# Patient Record
Sex: MALE | Race: WHITE | Employment: FULL TIME | URBAN - METROPOLITAN AREA
[De-identification: names, ages, dates, MRNs, and addresses within clinical notes are randomized per-mention and may not be internally consistent; named-entity substitution may affect disease eponyms.]

---

## 2020-09-12 ENCOUNTER — HOSPITAL ENCOUNTER (EMERGENCY)
Age: 35
Discharge: HOME | End: 2020-09-12
Attending: STUDENT IN AN ORGANIZED HEALTH CARE EDUCATION/TRAINING PROGRAM

## 2020-09-12 VITALS
OXYGEN SATURATION: 98 % | HEIGHT: 73 IN | RESPIRATION RATE: 15 BRPM | WEIGHT: 225 LBS | SYSTOLIC BLOOD PRESSURE: 127 MMHG | DIASTOLIC BLOOD PRESSURE: 96 MMHG | TEMPERATURE: 97.5 F | BODY MASS INDEX: 29.82 KG/M2 | HEART RATE: 60 BPM

## 2020-09-12 LAB
ANION GAP SERPL CALCULATED.3IONS-SCNC: 12 MMOL/L (ref 3–16)
BASOPHILS ABSOLUTE: 0 K/UL (ref 0–0.2)
BASOPHILS RELATIVE PERCENT: 0.4 %
BUN BLDV-MCNC: 15 MG/DL (ref 7–20)
CALCIUM SERPL-MCNC: 9.3 MG/DL (ref 8.3–10.6)
CHLORIDE BLD-SCNC: 102 MMOL/L (ref 99–110)
CO2: 23 MMOL/L (ref 21–32)
CREAT SERPL-MCNC: 0.8 MG/DL (ref 0.9–1.3)
EOSINOPHILS ABSOLUTE: 0 K/UL (ref 0–0.6)
EOSINOPHILS RELATIVE PERCENT: 0.3 %
GFR AFRICAN AMERICAN: >60
GFR NON-AFRICAN AMERICAN: >60
GLUCOSE BLD-MCNC: 110 MG/DL (ref 70–99)
HCT VFR BLD CALC: 42.9 % (ref 40.5–52.5)
HEMOGLOBIN: 15 G/DL (ref 13.5–17.5)
LYMPHOCYTES ABSOLUTE: 1.1 K/UL (ref 1–5.1)
LYMPHOCYTES RELATIVE PERCENT: 14.6 %
MCH RBC QN AUTO: 29.5 PG (ref 26–34)
MCHC RBC AUTO-ENTMCNC: 34.9 G/DL (ref 31–36)
MCV RBC AUTO: 84.6 FL (ref 80–100)
MONOCYTES ABSOLUTE: 0.4 K/UL (ref 0–1.3)
MONOCYTES RELATIVE PERCENT: 4.9 %
NEUTROPHILS ABSOLUTE: 6.3 K/UL (ref 1.7–7.7)
NEUTROPHILS RELATIVE PERCENT: 79.8 %
PDW BLD-RTO: 13.3 % (ref 12.4–15.4)
PLATELET # BLD: 242 K/UL (ref 135–450)
PMV BLD AUTO: 8.5 FL (ref 5–10.5)
POTASSIUM REFLEX MAGNESIUM: 4.2 MMOL/L (ref 3.5–5.1)
RBC # BLD: 5.07 M/UL (ref 4.2–5.9)
SODIUM BLD-SCNC: 137 MMOL/L (ref 136–145)
TSH REFLEX: 0.66 UIU/ML (ref 0.27–4.2)
WBC # BLD: 7.9 K/UL (ref 4–11)

## 2020-09-12 PROCEDURE — 84443 ASSAY THYROID STIM HORMONE: CPT

## 2020-09-12 PROCEDURE — 80048 BASIC METABOLIC PNL TOTAL CA: CPT

## 2020-09-12 PROCEDURE — 6370000000 HC RX 637 (ALT 250 FOR IP): Performed by: STUDENT IN AN ORGANIZED HEALTH CARE EDUCATION/TRAINING PROGRAM

## 2020-09-12 PROCEDURE — 85025 COMPLETE CBC W/AUTO DIFF WBC: CPT

## 2020-09-12 PROCEDURE — 99283 EMERGENCY DEPT VISIT LOW MDM: CPT

## 2020-09-12 RX ORDER — HYDROXYZINE PAMOATE 25 MG/1
25 CAPSULE ORAL ONCE
Status: COMPLETED | OUTPATIENT
Start: 2020-09-12 | End: 2020-09-12

## 2020-09-12 RX ORDER — HYDROXYZINE HYDROCHLORIDE 25 MG/1
25 TABLET, FILM COATED ORAL EVERY 8 HOURS PRN
Qty: 20 TABLET | Refills: 0 | Status: SHIPPED | OUTPATIENT
Start: 2020-09-12 | End: 2020-09-22

## 2020-09-12 RX ADMIN — HYDROXYZINE PAMOATE 25 MG: 25 CAPSULE ORAL at 10:47

## 2020-09-12 SDOH — HEALTH STABILITY: MENTAL HEALTH: HOW OFTEN DO YOU HAVE A DRINK CONTAINING ALCOHOL?: NEVER

## 2020-09-12 ASSESSMENT — ENCOUNTER SYMPTOMS
NAUSEA: 0
BACK PAIN: 0
SORE THROAT: 0
COUGH: 0
RHINORRHEA: 0
VOMITING: 0
ABDOMINAL PAIN: 0
SHORTNESS OF BREATH: 0
PHOTOPHOBIA: 0

## 2020-09-12 NOTE — ED PROVIDER NOTES
[address] [facility]  EMERGENCY DEPARTMENT ENCOUNTER      Pt Name: Deysi Barajas  MRN: [id number]  [place] 1985  Date of evaluation: 9/12/2020  Provider: Rajan Urrutia, [address] [place] [address]       Chief Complaint   Patient presents with    Insomnia     pt states he did not sleep last night and was sweating all night. HISTORY OF PRESENT ILLNESS   (Location/Symptom, Timing/Onset, Context/Setting, Quality, Duration, Modifying Factors, Severity)  Note limiting factors. Deysi aBrajas is a 28 y.o. male who presents to the emergency department complaining of insomnia. Prior history significantly practice 5 years ago for similar complaint, previously on Ambien. Patient presents complaining of anxiety, difficulty sleeping at night. Patient reports over the last 1 to 2 months he has had episodes of anxiety feels overwhelmed with situation, no life change, denies drug abuse. Patient is currently in outpatient rehab for opiate abuse. Denies relapse. Patient denies any pain complaints denies chest pain shortness of breath abdominal pain nausea vomiting headaches vision change or focal neuro deficit. Patient states he begins to feel extremely anxious gets hot and sweaty, episode occurred  Again last night and had difficulty sleeping. Nursing Notes were reviewed. PAST MEDICAL HISTORY   History reviewed. No pertinent past medical history. SURGICAL HISTORY     History reviewed. No pertinent surgical history. CURRENT MEDICATIONS       Previous Medications    BUPRENORPHINE HCL BU    Inject 300 mg as directed every 30 days       ALLERGIES     Patient has no known allergies. FAMILY HISTORY     History reviewed. No pertinent family history.        SOCIAL HISTORY       Social History     Socioeconomic History    Marital status:      Spouse name: None    Number of children: None    Years of education: None    Highest education level: None   Occupational History    None [facility],  [place], [facility]   Phone [phone]   TSH WITH REFLEX    Narrative:     Performed at:  [facility] ([facility]) - [facility],  [place], [facility]   Phone [phone]       All other labs were within normal range or not returned as of this dictation. EMERGENCY DEPARTMENT COURSE and DIFFERENTIAL DIAGNOSIS/MDM:   Chanelle Oh is a 28 y.o. male who presents to the emergency department with the complaint of anxiety ongoing over the last 2 months intermittent, reports he had a \"panic attack \"last night had difficulty sleeping. No previous work-up will obtain basic labs CBC BMP TSH level will treat with Atarax. Patient follows with a primary care doctor if work-up negative will likely discharge patient home to follow-up with PCP. Blood work reviewed and negative for acute abnormalities, vitals remained stable patient is well-appearing at time of discharge will give prescription for Atarax and have patient follow-up with his PCP. CRITICAL CARE TIME   Total Critical Care time was at least  minutes, excluding separately reportable procedures. There was a high probability of clinically significant/life threatening deterioration in the patient's condition which required my urgent intervention. Clinical concern   Intervention     CONSULTS:  None    PROCEDURES:  Unless otherwise noted below, none     Procedures        FINAL IMPRESSION      1.  Anxiety state          DISPOSITION/PLAN   DISPOSITION Decision To Discharge 09/12/2020 11:44:23 AM      PATIENT REFERRED TO:  [place] [facility]  [address] [facility]  [phone]    If symptoms worsen    PCP  Please follow-up with your primary care doctor in the coming week  Schedule an appointment as soon as possible for a visit         DISCHARGE MEDICATIONS:  New Prescriptions    HYDROXYZINE (ATARAX) 25 MG TABLET    Take 1 tablet by mouth every 8 hours as needed for Anxiety     Controlled Substances Monitoring:     No flowsheet data found.     (Please note that portions of this note were completed with a voice recognition program.  Efforts were made to edit the dictations but occasionally words are mis-transcribed.)    Dylan Ybarra DO (electronically signed)  Attending Emergency Physician            Dylan Ybarra DO  09/12/20 1149

## 2020-12-18 ENCOUNTER — OFFICE VISIT (OUTPATIENT)
Dept: SURGERY | Age: 35
End: 2020-12-18
Payer: MEDICARE

## 2020-12-18 ENCOUNTER — TELEPHONE (OUTPATIENT)
Dept: SURGERY | Age: 35
End: 2020-12-18

## 2020-12-18 VITALS
BODY MASS INDEX: 28.49 KG/M2 | TEMPERATURE: 97.7 F | DIASTOLIC BLOOD PRESSURE: 80 MMHG | HEART RATE: 71 BPM | WEIGHT: 215 LBS | HEIGHT: 73 IN | SYSTOLIC BLOOD PRESSURE: 119 MMHG

## 2020-12-18 PROCEDURE — 99202 OFFICE O/P NEW SF 15 MIN: CPT | Performed by: SURGERY

## 2020-12-18 NOTE — PROGRESS NOTES
Subjective:     Patient is a 28 y.o. man with hemorrhoids    HPI: Mr. Satya Ching reports 1 week of painful swollen hemorrhoids. He reports he eats a bad diet due to being on the road and is chronically constipated. He is currently receiving therapy for opiate abuse. No Known Allergies   Social History     Tobacco Use    Smoking status: Never Smoker    Smokeless tobacco: Current User     Types: Chew   Substance Use Topics    Alcohol use: Never     Frequency: Never      Review of Systems    GEN: reviewed and negative except as noted in HPI. GI: reviewed and negative except as noted in HPI. + constipation    Objective:     GEN: appears well, no distress, appears stated age  PSYCH: normal mood, normal affect  NECK: no neck masses, trachea midline  ENT: moist oral mucosa; anicteric  SKIN: no rash or jaundice  CV: regular heart rate and rhythm  PULM: normal respiratory effort, no wheezing  GI: soft non tender abdomen. Normal bowel sounds  RECTAL: Thrombosed hemorrhoid left side 2 cm. No erosion. Right side small 0.5 cm thrombosed hemorrhoid  EXT/NEURO: normal gait, strength/sensation grossly intact in all extremities      Assessment: Thrombosed hemorrhoids    Plan:     Discussed RBA of various treatment options. At this point I recommend the patient supplement the diet with some fiber and try topical salve. Calmoseptine samples given. Do not use suppositories. Discussed the indications where hemorrhoidectomy may be considered and the considerable pain expectations with this operation. Discussed natural course of these lesions is they will shrink down and resolve on their own. Therefore I do not recommend surgery. Patient expressed his displeasure with driving a long way to this office and not receiving any relief. Discussed these will settle down on their own and I don't recommend surgery. Discussed if future thromboses occur see my partner or I within 3 days for lancing.      Fany Moore M.D.  12/18/20

## 2023-09-28 ENCOUNTER — OFFICE (OUTPATIENT)
Dept: URBAN - METROPOLITAN AREA CLINIC 16 | Facility: CLINIC | Age: 38
End: 2023-09-28

## 2023-09-28 VITALS
DIASTOLIC BLOOD PRESSURE: 74 MMHG | OXYGEN SATURATION: 98 % | HEART RATE: 86 BPM | HEIGHT: 72 IN | WEIGHT: 220 LBS | SYSTOLIC BLOOD PRESSURE: 118 MMHG

## 2023-09-28 DIAGNOSIS — B18.2 CHRONIC VIRAL HEPATITIS C: ICD-10-CM

## 2023-09-28 DIAGNOSIS — R79.89 OTHER SPECIFIED ABNORMAL FINDINGS OF BLOOD CHEMISTRY: ICD-10-CM

## 2023-09-28 PROCEDURE — 99204 OFFICE O/P NEW MOD 45 MIN: CPT | Performed by: INTERNAL MEDICINE

## 2023-09-28 NOTE — SERVICENOTES
He may have worsening liver tests based on nutritional supplement in addition to hepatitis C he will have work-up and treatment with my nurse practitioner and will make arrangements accordingly I will add some other labs for completeness

## 2023-09-28 NOTE — SERVICEHPINOTES
Erickson Galvez   is seen today for a follow-up visit.     Erickson was sent for consultation at this time for abnormal LFTs on urgent consultation and evaluation. He had recent blood work done last Friday noting abnormality. On review of his charting and referral he does have a history of ADHD. And does have a family history of early CAD. Did have COVID vaccination in April 2021 he never smoked. He does not drink. His previous vitals on the referral were normal. Labs were ordered including a acute hepatitis panel on September 25 and he was referred to Michelle review of his charting he was noted to have history of ADHD medications including Vyvanse which is unaffordable. He had used Concerta which caused headache and he did not feel well and prior to that had used Adderall.He does work in sales as manager for [employer] did drink alcohol in the past when he was in college and on weekends etc. with his friends such as beer etc. and he has no chronic alcohol use. Mostly intermittent with friends etc. now he only drinks very very occasionally i.e. New Year's Renetta etc. Regardless I was able to review his labs and his CBC was completely normal with a normal platelet count in regards to his liver tests his albumin was 4.6 his total bilirubin was normal at 0.5 his alk phos was 153  and . He has used his Adderall in the past from medication standpoint but only regularly and not overused and Kratom-caffeine this energy drink.He did have IV drug use at the age of 21 or 22 this is more than 15 years ago and likely developed hepatitis C from that his brother had hepatitis C as well. He was seen at [facility] a few years ago and was noted to have hepatitis C but was not a consideration for treatment at that time
br
br His sugar was minimally elevated 113 and his TSH was normal at 1.530. He had a cholesterol total of 98 and total cholesterol 155 LDL was 98.  . He had a cholesterol total of 98 and total cholesterol 155 LDL was 98

## 2023-11-10 ENCOUNTER — OFFICE (OUTPATIENT)
Dept: URBAN - METROPOLITAN AREA CLINIC 16 | Facility: CLINIC | Age: 38
End: 2023-11-10

## 2024-12-31 ENCOUNTER — OFFICE VISIT (OUTPATIENT)
Age: 39
End: 2024-12-31

## 2024-12-31 VITALS
DIASTOLIC BLOOD PRESSURE: 66 MMHG | HEART RATE: 86 BPM | BODY MASS INDEX: 29.8 KG/M2 | OXYGEN SATURATION: 97 % | SYSTOLIC BLOOD PRESSURE: 126 MMHG | HEIGHT: 72 IN | WEIGHT: 220 LBS | TEMPERATURE: 97.3 F

## 2024-12-31 DIAGNOSIS — J01.00 ACUTE NON-RECURRENT MAXILLARY SINUSITIS: Primary | ICD-10-CM

## 2024-12-31 DIAGNOSIS — R09.81 NASAL CONGESTION: ICD-10-CM

## 2024-12-31 DIAGNOSIS — M79.10 MYALGIA: ICD-10-CM

## 2024-12-31 RX ORDER — AZITHROMYCIN 250 MG/1
TABLET, FILM COATED ORAL
Qty: 6 TABLET | Refills: 0 | Status: SHIPPED | OUTPATIENT
Start: 2024-12-31 | End: 2025-01-10

## 2024-12-31 ASSESSMENT — ENCOUNTER SYMPTOMS
SINUS PRESSURE: 1
DIARRHEA: 0
SORE THROAT: 0
NAUSEA: 0
SHORTNESS OF BREATH: 0
COUGH: 0
VOMITING: 0
SINUS PAIN: 1

## 2025-01-01 NOTE — PROGRESS NOTES
Ant Tesfaye (:  1985) is a 39 y.o. male,New patient, here for evaluation of the following chief complaint(s):  Sinusitis (Eye pain), Generalized Body Aches, and Ear Pain      ASSESSMENT/PLAN:    ICD-10-CM    1. Acute non-recurrent maxillary sinusitis  J01.00 azithromycin (ZITHROMAX) 250 MG tablet      2. Myalgia  M79.10 POCT Influenza A/B Antigen     POCT COVID-19, Antigen      3. Nasal congestion  R09.81           Recommend OTC treatment for symptoms:  ibuprofen (Advil, Motrin) and acetaminophen (Tylenol) for fevers and pain relief.  decongestants (specifically pseudoephedrine - found behind the pharmacy counter - does not need a prescription)  along with antihistamines (Claritin, Zyrtec, Allegra, or Xyzal) and nasal steroid sprays (such as Flonase) to help with nasal congestion and runny nose.  Saline Mist Sprays such as [employer] Simply Saline to loosen and clear secretions  guaifenesin (Mucinex) can help with thinning out mucus which can help with chest congestion or with relieving persistent sinus pressure  throat sprays (Cepacol, chloraseptic) for throat pain.  throat lozenges, and increased water intake for cough.  honey (1-2 teaspoons every hour) for relief of throat irritation and coughing fits.  warm teas, humidifiers, nasal lavages, and sleeping in an inclined position are also helpful options that can lessen symptoms.  Recommend warm compresses over the symptomatic ear(s) for 10-15 minutes, or a hot shower, followed by 1-2 minutes of massaging the area behind your ears and down the jaw-line to help with the ear congestion/ear pressure.     Follow up with your PCP within 5 days or, if unable, you can return to the clinic if symptoms persist beyond 5 days or if symptoms worsen.     If you develop shortness of breath, increased work of breathing, lightheadedness, or chest pain, contact 911, or follow up immediately with the nearest Emergency Department for further evaluation.